# Patient Record
Sex: FEMALE | Race: WHITE | URBAN - NONMETROPOLITAN AREA
[De-identification: names, ages, dates, MRNs, and addresses within clinical notes are randomized per-mention and may not be internally consistent; named-entity substitution may affect disease eponyms.]

---

## 2020-06-15 ENCOUNTER — OFFICE VISIT (OUTPATIENT)
Dept: URBAN - NONMETROPOLITAN AREA CLINIC 2 | Facility: CLINIC | Age: 85
End: 2020-06-15
Payer: COMMERCIAL

## 2020-06-15 DIAGNOSIS — R10.13 ABDOMINAL PAIN, EPIGASTRIC: ICD-10-CM

## 2020-06-15 DIAGNOSIS — K90.89 BILE SALT-INDUCED DIARRHEA: ICD-10-CM

## 2020-06-15 PROCEDURE — 99214 OFFICE O/P EST MOD 30 MIN: CPT | Performed by: INTERNAL MEDICINE

## 2020-06-15 PROCEDURE — 1036F TOBACCO NON-USER: CPT | Performed by: INTERNAL MEDICINE

## 2020-06-15 PROCEDURE — G8427 DOCREV CUR MEDS BY ELIG CLIN: HCPCS | Performed by: INTERNAL MEDICINE

## 2020-06-15 PROCEDURE — G9903 PT SCRN TBCO ID AS NON USER: HCPCS | Performed by: INTERNAL MEDICINE

## 2020-06-15 PROCEDURE — G8419 CALC BMI OUT NRM PARAM NOF/U: HCPCS | Performed by: INTERNAL MEDICINE

## 2020-06-15 RX ORDER — COLESEVELAM HYDROCHLORIDE 625 MG/1
2 TABLETS IN THE AM AND 1 IN THE PM TABLET, FILM COATED ORAL DAILY
Qty: 270 TABLET | Refills: 3 | OUTPATIENT

## 2020-06-15 RX ORDER — NAPROXEN SODIUM 220 MG
TAKE 2 PO QAM TABLET ORAL
Qty: 0 | Refills: 0 | Status: ON HOLD | COMMUNITY
Start: 1900-01-01

## 2020-06-15 NOTE — HPI-OTHER HISTORIES
Pt comes in for follow up of alternating constipation/diarrhea. She is currently having a BM about q2-3 days. Stools are formed and adequate. She is not having incontinence. There is no bleeding. She has no abdominal pain, gas or bloating. Her appetite is good and wt is stable.  She has rare heartburn and takes an occasional TUMs. There is no dysphagia. She has no N/V. Appetite is good and wt is stable.

## 2020-12-18 ENCOUNTER — OFFICE VISIT (OUTPATIENT)
Dept: URBAN - NONMETROPOLITAN AREA CLINIC 2 | Facility: CLINIC | Age: 85
End: 2020-12-18
Payer: COMMERCIAL

## 2020-12-18 DIAGNOSIS — R19.7 DIARRHEA: ICD-10-CM

## 2020-12-18 DIAGNOSIS — R10.13 ABDOMINAL PAIN, EPIGASTRIC: ICD-10-CM

## 2020-12-18 DIAGNOSIS — K59.09 OTHER CONSTIPATION: ICD-10-CM

## 2020-12-18 PROCEDURE — G8417 CALC BMI ABV UP PARAM F/U: HCPCS | Performed by: INTERNAL MEDICINE

## 2020-12-18 PROCEDURE — 99213 OFFICE O/P EST LOW 20 MIN: CPT | Performed by: INTERNAL MEDICINE

## 2020-12-18 PROCEDURE — G8427 DOCREV CUR MEDS BY ELIG CLIN: HCPCS | Performed by: INTERNAL MEDICINE

## 2020-12-18 PROCEDURE — G9903 PT SCRN TBCO ID AS NON USER: HCPCS | Performed by: INTERNAL MEDICINE

## 2020-12-18 PROCEDURE — G8484 FLU IMMUNIZE NO ADMIN: HCPCS | Performed by: INTERNAL MEDICINE

## 2020-12-18 PROCEDURE — 1036F TOBACCO NON-USER: CPT | Performed by: INTERNAL MEDICINE

## 2020-12-18 RX ORDER — NAPROXEN SODIUM 220 MG
TAKE 2 PO QAM TABLET ORAL
Qty: 0 | Refills: 0 | Status: ON HOLD | COMMUNITY
Start: 1900-01-01

## 2020-12-18 RX ORDER — COLESTIPOL HYDROCHLORIDE 1 G/1
TAKE 2 CAPSULES IN THE AM AND ONE IN THE PM TABLET ORAL DAILY
Qty: 270 CAPSULE | Refills: 3 | OUTPATIENT
Start: 2020-12-20

## 2020-12-18 RX ORDER — COLESEVELAM HYDROCHLORIDE 625 MG/1
2 TABLETS IN THE AM AND 1 IN THE PM TABLET, FILM COATED ORAL DAILY
Qty: 270 TABLET | Refills: 3 | Status: ACTIVE | COMMUNITY

## 2020-12-18 NOTE — HPI-TODAY'S VISIT:
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  Patient is currently on Colestid.  She takes 2 in the morning and 1 at night.  For the most part, this controls her diarrhea.  She has about 1 loose stool per week.  She has no abdominal pain, gas or bloating.  There is no evidence of bleeding.  Her appetite and weight are  stable. She has rare heartburn.  She has no dysphagia.  She denies nausea or vomiting.  She has not been anemic or iron deficient recently.

## 2022-02-20 ENCOUNTER — ERX REFILL RESPONSE (OUTPATIENT)
Dept: URBAN - NONMETROPOLITAN AREA CLINIC 2 | Facility: CLINIC | Age: 87
End: 2022-02-20

## 2022-02-20 RX ORDER — COLESTIPOL HYDROCHLORIDE 1 G/1
TAKE 2 CAPSULES IN THE AM AND ONE IN THE PM DAILY TABLET, FILM COATED ORAL
Qty: 270 TABLET | Refills: 4 | OUTPATIENT

## 2022-02-20 RX ORDER — COLESTIPOL HYDROCHLORIDE 1 G/1
TAKE 2 CAPSULES IN THE AM AND ONE IN THE PM TABLET ORAL DAILY
Qty: 270 CAPSULE | Refills: 3 | OUTPATIENT

## 2022-09-09 ENCOUNTER — TELEPHONE ENCOUNTER (OUTPATIENT)
Dept: URBAN - NONMETROPOLITAN AREA CLINIC 2 | Facility: CLINIC | Age: 87
End: 2022-09-09

## 2022-09-09 RX ORDER — COLESTIPOL HYDROCHLORIDE 1 G/1
TAKE 2 CAPSULES IN THE AM AND ONE IN THE PM DAILY TABLET, FILM COATED ORAL
Qty: 270 TABLET | Refills: 4

## 2022-12-08 ENCOUNTER — OFFICE VISIT (OUTPATIENT)
Dept: URBAN - NONMETROPOLITAN AREA CLINIC 2 | Facility: CLINIC | Age: 87
End: 2022-12-08

## 2023-01-12 ENCOUNTER — OFFICE VISIT (OUTPATIENT)
Dept: URBAN - NONMETROPOLITAN AREA CLINIC 2 | Facility: CLINIC | Age: 88
End: 2023-01-12
Payer: COMMERCIAL

## 2023-01-12 VITALS
TEMPERATURE: 97.6 F | HEIGHT: 65 IN | WEIGHT: 155 LBS | BODY MASS INDEX: 25.83 KG/M2 | SYSTOLIC BLOOD PRESSURE: 132 MMHG | DIASTOLIC BLOOD PRESSURE: 66 MMHG | HEART RATE: 109 BPM

## 2023-01-12 DIAGNOSIS — K31.84 GASTROPARESIS: ICD-10-CM

## 2023-01-12 DIAGNOSIS — R19.7 DIARRHEA: ICD-10-CM

## 2023-01-12 DIAGNOSIS — K21.9 GASTROESOPHAGEAL REFLUX DISEASE, UNSPECIFIED WHETHER ESOPHAGITIS PRESENT: ICD-10-CM

## 2023-01-12 DIAGNOSIS — K76.0 FATTY LIVER: ICD-10-CM

## 2023-01-12 PROBLEM — 197321007: Status: ACTIVE | Noted: 2023-01-12

## 2023-01-12 PROBLEM — 305058001: Status: ACTIVE | Noted: 2023-01-12

## 2023-01-12 PROBLEM — 235595009: Status: ACTIVE | Noted: 2023-01-12

## 2023-01-12 PROBLEM — 235675006: Status: ACTIVE | Noted: 2023-01-12

## 2023-01-12 PROCEDURE — 99214 OFFICE O/P EST MOD 30 MIN: CPT | Performed by: NURSE PRACTITIONER

## 2023-01-12 RX ORDER — COLESTIPOL HYDROCHLORIDE 1 G/1
1 TABLET TABLET, FILM COATED ORAL ONCE A DAY
Qty: 30 TABLET | Refills: 11

## 2023-01-12 RX ORDER — GABAPENTIN 100 MG/1
1 CAPSULE CAPSULE ORAL ONCE A DAY
Status: ACTIVE | COMMUNITY

## 2023-01-12 RX ORDER — AZELASTINE HYDROCHLORIDE 137 UG/1
1 PUFF IN EACH NOSTRIL SPRAY, METERED NASAL TWICE A DAY
Status: ACTIVE | COMMUNITY

## 2023-01-12 RX ORDER — NAPROXEN SODIUM 220 MG
TAKE 2 PO QAM TABLET ORAL
Qty: 0 | Refills: 0 | Status: ACTIVE | COMMUNITY
Start: 1900-01-01

## 2023-01-12 RX ORDER — FAMOTIDINE 20 MG/1
1 TABLET TABLET ORAL
Qty: 30 TABLETS | Refills: 11 | OUTPATIENT
Start: 2023-01-12

## 2023-01-12 RX ORDER — COLESEVELAM HYDROCHLORIDE 625 MG/1
2 TABLETS IN THE AM AND 1 IN THE PM TABLET, FILM COATED ORAL DAILY
Qty: 270 TABLET | Refills: 3 | Status: ACTIVE | COMMUNITY

## 2023-01-12 RX ORDER — TRAMADOL HYDROCHLORIDE 50 MG/1
1 TABLET AS NEEDED TABLET, FILM COATED ORAL ONCE A DAY
Status: ACTIVE | COMMUNITY

## 2023-01-12 RX ORDER — COLESTIPOL HYDROCHLORIDE 1 G/1
TAKE 2 CAPSULES IN THE AM AND ONE IN THE PM DAILY TABLET, FILM COATED ORAL
Qty: 270 TABLET | Refills: 4 | Status: ON HOLD | COMMUNITY

## 2023-01-12 NOTE — HPI-TODAY'S VISIT:
1/12/2023 Mrs. Farley presents for follow-up of gastroparesis and diarrhea.  Today she is doing well.  She is on colestipol 1 g daily.  She has a bowel movement most days of the week.  She does have postprandial bloating and dyspepsia at times.  She wonders what to take for this.  She has a history of gastroparesis documented on gastric emptying study and 2011.  Today we have discussed the gastroparesis diet and adding Pepcid daily to as needed.  Today she is doing well otherwise.  MB

## 2023-01-13 LAB
ALBUMIN/GLOBULIN RATIO: 1.5
ALBUMIN: 3.8
ALKALINE PHOSPHATASE: 62
ALT (SGPT): 11
AST (SGOT): 13
BILIRUBIN, DIRECT: 0.1
BILIRUBIN, INDIRECT: 0.3
BILIRUBIN, TOTAL: 0.4
GLOBULIN: 2.5
PROTEIN, TOTAL: 6.3

## 2023-01-25 ENCOUNTER — OFFICE VISIT (OUTPATIENT)
Dept: URBAN - NONMETROPOLITAN AREA CLINIC 2 | Facility: CLINIC | Age: 88
End: 2023-01-25

## 2023-02-07 ENCOUNTER — ERX REFILL RESPONSE (OUTPATIENT)
Dept: URBAN - NONMETROPOLITAN AREA CLINIC 2 | Facility: CLINIC | Age: 88
End: 2023-02-07

## 2023-02-07 RX ORDER — COLESTIPOL HYDROCHLORIDE 1 G/1
1 TABLET TABLET, FILM COATED ORAL ONCE A DAY
Qty: 30 TABLET | Refills: 11 | OUTPATIENT

## 2023-02-07 RX ORDER — FAMOTIDINE 20 MG/1
1 TABLET TABLET ORAL
Qty: 30 TABLETS | Refills: 11 | OUTPATIENT

## 2023-06-20 ENCOUNTER — TELEPHONE ENCOUNTER (OUTPATIENT)
Dept: URBAN - NONMETROPOLITAN AREA CLINIC 2 | Facility: CLINIC | Age: 88
End: 2023-06-20

## 2023-06-20 RX ORDER — COLESTIPOL HYDROCHLORIDE 1 G/1
1 TABLET TABLET, FILM COATED ORAL ONCE A DAY
Qty: 90 TABLET | Refills: 3

## 2023-09-22 ENCOUNTER — P2P PATIENT RECORD (OUTPATIENT)
Age: 88
End: 2023-09-22

## 2024-01-12 ENCOUNTER — OFFICE VISIT (OUTPATIENT)
Dept: URBAN - NONMETROPOLITAN AREA CLINIC 2 | Facility: CLINIC | Age: 89
End: 2024-01-12

## 2024-01-16 ENCOUNTER — DASHBOARD ENCOUNTERS (OUTPATIENT)
Age: 89
End: 2024-01-16

## 2024-01-16 ENCOUNTER — OFFICE VISIT (OUTPATIENT)
Dept: URBAN - NONMETROPOLITAN AREA CLINIC 2 | Facility: CLINIC | Age: 89
End: 2024-01-16
Payer: COMMERCIAL

## 2024-01-16 VITALS
BODY MASS INDEX: 25.83 KG/M2 | HEIGHT: 65 IN | DIASTOLIC BLOOD PRESSURE: 77 MMHG | HEART RATE: 93 BPM | SYSTOLIC BLOOD PRESSURE: 127 MMHG | TEMPERATURE: 98.7 F | WEIGHT: 155 LBS

## 2024-01-16 DIAGNOSIS — K31.84 GASTROPARESIS: ICD-10-CM

## 2024-01-16 DIAGNOSIS — Z12.11 COLON CANCER SCREENING: ICD-10-CM

## 2024-01-16 DIAGNOSIS — R19.7 DIARRHEA: ICD-10-CM

## 2024-01-16 DIAGNOSIS — K76.0 FATTY LIVER: ICD-10-CM

## 2024-01-16 DIAGNOSIS — K21.9 GASTROESOPHAGEAL REFLUX DISEASE, UNSPECIFIED WHETHER ESOPHAGITIS PRESENT: ICD-10-CM

## 2024-01-16 PROCEDURE — 99214 OFFICE O/P EST MOD 30 MIN: CPT | Performed by: NURSE PRACTITIONER

## 2024-01-16 RX ORDER — COLESTIPOL HYDROCHLORIDE 1 G/1
1 TABLET TABLET, FILM COATED ORAL TWICE DAILY
Qty: 180 TABLET | Refills: 3

## 2024-01-16 RX ORDER — FAMOTIDINE 20 MG/1
1 TABLET TABLET ORAL
Qty: 30 TABLETS | Refills: 11 | Status: ACTIVE | COMMUNITY

## 2024-01-16 RX ORDER — TRAMADOL HYDROCHLORIDE 50 MG/1
1 TABLET AS NEEDED TABLET, FILM COATED ORAL ONCE A DAY
Status: ACTIVE | COMMUNITY

## 2024-01-16 RX ORDER — NAPROXEN SODIUM 220 MG
TAKE 2 PO QAM TABLET ORAL
Qty: 0 | Refills: 0 | Status: ACTIVE | COMMUNITY
Start: 1900-01-01

## 2024-01-16 RX ORDER — GABAPENTIN 100 MG/1
1 CAPSULE CAPSULE ORAL ONCE A DAY
Status: ACTIVE | COMMUNITY

## 2024-01-16 RX ORDER — FAMOTIDINE 20 MG/1
1 TABLET TABLET ORAL ONCE A DAY
Qty: 90 TABLET | Refills: 3

## 2024-01-16 RX ORDER — AZELASTINE HYDROCHLORIDE 137 UG/1
1 PUFF IN EACH NOSTRIL SPRAY, METERED NASAL TWICE A DAY
Status: ACTIVE | COMMUNITY

## 2024-01-16 RX ORDER — COLESEVELAM HYDROCHLORIDE 625 MG/1
2 TABLETS IN THE AM AND 1 IN THE PM TABLET, FILM COATED ORAL DAILY
Qty: 270 TABLET | Refills: 3 | Status: ACTIVE | COMMUNITY

## 2024-01-16 RX ORDER — COLESTIPOL HYDROCHLORIDE 1 G/1
1 TABLET TABLET, FILM COATED ORAL ONCE A DAY
Qty: 90 TABLET | Refills: 3 | Status: ACTIVE | COMMUNITY

## 2024-01-16 NOTE — HPI-TODAY'S VISIT:
1/12/2023 Mrs. Farley presents for follow-up of gastroparesis and diarrhea.  Today she is doing well.  She is on colestipol 1 g daily.  She has a bowel movement most days of the week.  She does have postprandial bloating and dyspepsia at times.  She wonders what to take for this.  She has a history of gastroparesis documented on gastric emptying study and 2011.  Today we have discussed the gastroparesis diet and adding Pepcid daily to as needed.  Today she is doing well otherwise.  MB 1/16/2024 The patient presents for follow-up of reflux and diarrhea.  She is doing great with her diarrhea on colestipol 1 in the morning and a half in the evening.  Her reflux is stable on Pepcid as needed.  We have discussed using this prophylactically if she is can eat a trigger meal like barbecue.  Today she is here for refills with no new GI complaints.  MB

## 2025-02-24 ENCOUNTER — ERX REFILL RESPONSE (OUTPATIENT)
Dept: URBAN - NONMETROPOLITAN AREA CLINIC 2 | Facility: CLINIC | Age: OVER 89
End: 2025-02-24

## 2025-02-24 RX ORDER — COLESTIPOL HYDROCHLORIDE 1 G/1
1 TABLET TABLET, FILM COATED ORAL TWICE DAILY
Qty: 180 TABLET | Refills: 3 | OUTPATIENT